# Patient Record
Sex: FEMALE | Race: WHITE | NOT HISPANIC OR LATINO | Employment: OTHER | URBAN - METROPOLITAN AREA
[De-identification: names, ages, dates, MRNs, and addresses within clinical notes are randomized per-mention and may not be internally consistent; named-entity substitution may affect disease eponyms.]

---

## 2017-10-26 NOTE — PATIENT DISCUSSION
Capsular haze appears visually significant; RECOMMEND CONSULT with DR Laura Mujica for possible YAG capsulotomy.

## 2019-03-18 ENCOUNTER — NEW PATIENT COMPREHENSIVE (OUTPATIENT)
Dept: URBAN - METROPOLITAN AREA CLINIC 43 | Facility: CLINIC | Age: 74
End: 2019-03-18

## 2019-03-18 DIAGNOSIS — H25.811: ICD-10-CM

## 2019-03-18 DIAGNOSIS — H52.03: ICD-10-CM

## 2019-03-18 DIAGNOSIS — H53.2: ICD-10-CM

## 2019-03-18 DIAGNOSIS — H25.812: ICD-10-CM

## 2019-03-18 PROCEDURE — 92015 DETERMINE REFRACTIVE STATE: CPT

## 2019-03-18 PROCEDURE — 92004 COMPRE OPH EXAM NEW PT 1/>: CPT

## 2019-03-18 ASSESSMENT — VISUAL ACUITY
OD_SC: J16
OS_BAT: 20/400
OU_CC: J1
OD_CC: 20/40-1
OD_SC: 20/80
OU_CC: 20/40+2
OS_CC: 20/40
OS_SC: 20/400
OD_CC: J1
OU_SC: 20/80-1
OS_CC: J3
OS_SC: 20/200+1
OD_BAT: 20/400
OU_SC: J16

## 2019-03-18 ASSESSMENT — TONOMETRY
OS_IOP_MMHG: 12
OD_IOP_MMHG: 12

## 2019-03-26 ENCOUNTER — CATARACT CONSULT (OUTPATIENT)
Dept: URBAN - METROPOLITAN AREA CLINIC 43 | Facility: CLINIC | Age: 74
End: 2019-03-26

## 2019-03-26 DIAGNOSIS — H04.123: ICD-10-CM

## 2019-03-26 DIAGNOSIS — H53.2: ICD-10-CM

## 2019-03-26 DIAGNOSIS — H25.812: ICD-10-CM

## 2019-03-26 DIAGNOSIS — H25.811: ICD-10-CM

## 2019-03-26 DIAGNOSIS — H18.51: ICD-10-CM

## 2019-03-26 PROCEDURE — V2799I IMPRIMIS

## 2019-03-26 PROCEDURE — 92025-1 CORNEAL TOPOGRAPHY, INS

## 2019-03-26 PROCEDURE — 92286 ANT SGM IMG I&R SPECLR MIC: CPT

## 2019-03-26 PROCEDURE — 92014 COMPRE OPH EXAM EST PT 1/>: CPT

## 2019-03-26 PROCEDURE — 92136TC INTERFEROMETRY - TECHNICAL COMPONENT

## 2019-03-26 ASSESSMENT — VISUAL ACUITY
OD_BAT: 20/400
OD_CC: J2
OS_AM: 20/20
OS_SC: J12
OD_SC: J12
OD_CC: 20/40
OS_SC: 20/200
OD_RAM: 20/20
OD_SC: 20/60-2
OS_BAT: 20/400
OS_CC: 20/40-1
OS_CC: J4

## 2019-03-26 ASSESSMENT — TONOMETRY
OS_IOP_MMHG: 13
OD_IOP_MMHG: 13

## 2019-03-28 ENCOUNTER — DIAGNOSTICS ONLY (OUTPATIENT)
Dept: URBAN - METROPOLITAN AREA CLINIC 39 | Facility: CLINIC | Age: 74
End: 2019-03-28

## 2019-03-28 PROCEDURE — 92025 CPTRIZED CORNEAL TOPOGRAPHY: CPT

## 2019-03-28 PROCEDURE — 92136 OPHTHALMIC BIOMETRY: CPT

## 2019-03-29 ENCOUNTER — SURGERY/PROCEDURE (OUTPATIENT)
Dept: URBAN - METROPOLITAN AREA SURGERY 14 | Facility: SURGERY | Age: 74
End: 2019-03-29

## 2019-03-29 ENCOUNTER — PRE-OP/H&P (OUTPATIENT)
Dept: URBAN - METROPOLITAN AREA CLINIC 39 | Facility: CLINIC | Age: 74
End: 2019-03-29

## 2019-03-29 ENCOUNTER — POST-OP CATARACT (OUTPATIENT)
Dept: URBAN - METROPOLITAN AREA CLINIC 39 | Facility: CLINIC | Age: 74
End: 2019-03-29

## 2019-03-29 DIAGNOSIS — H18.51: ICD-10-CM

## 2019-03-29 DIAGNOSIS — H02.831: ICD-10-CM

## 2019-03-29 DIAGNOSIS — H02.834: ICD-10-CM

## 2019-03-29 DIAGNOSIS — H25.812: ICD-10-CM

## 2019-03-29 DIAGNOSIS — Z96.1: ICD-10-CM

## 2019-03-29 DIAGNOSIS — H04.123: ICD-10-CM

## 2019-03-29 DIAGNOSIS — H25.811: ICD-10-CM

## 2019-03-29 DIAGNOSIS — H53.2: ICD-10-CM

## 2019-03-29 DIAGNOSIS — I69.898: ICD-10-CM

## 2019-03-29 PROCEDURE — 99211T TECH SERVICE

## 2019-03-29 PROCEDURE — 66984 XCAPSL CTRC RMVL W/O ECP: CPT

## 2019-03-29 ASSESSMENT — TONOMETRY: OD_IOP_MMHG: 12

## 2019-03-29 ASSESSMENT — VISUAL ACUITY: OD_SC: 20/70

## 2019-04-01 ENCOUNTER — POST OP/EVAL OF SECOND EYE (OUTPATIENT)
Dept: URBAN - METROPOLITAN AREA CLINIC 43 | Facility: CLINIC | Age: 74
End: 2019-04-01

## 2019-04-01 DIAGNOSIS — H53.2: ICD-10-CM

## 2019-04-01 DIAGNOSIS — Z96.1: ICD-10-CM

## 2019-04-01 DIAGNOSIS — H04.123: ICD-10-CM

## 2019-04-01 DIAGNOSIS — H02.831: ICD-10-CM

## 2019-04-01 DIAGNOSIS — H25.812: ICD-10-CM

## 2019-04-01 PROCEDURE — 99024 POSTOP FOLLOW-UP VISIT: CPT

## 2019-04-01 PROCEDURE — 92012 INTRM OPH EXAM EST PATIENT: CPT

## 2019-04-01 ASSESSMENT — TONOMETRY
OD_IOP_MMHG: 13
OS_IOP_MMHG: 14

## 2019-04-01 ASSESSMENT — VISUAL ACUITY
OS_CC: 20/40
OD_SC: 20/30-2
OS_BAT: 20/400

## 2019-04-05 ENCOUNTER — PRE-OP/H&P (OUTPATIENT)
Dept: URBAN - METROPOLITAN AREA CLINIC 39 | Facility: CLINIC | Age: 74
End: 2019-04-05

## 2019-04-05 ENCOUNTER — SURGERY/PROCEDURE (OUTPATIENT)
Dept: URBAN - METROPOLITAN AREA SURGERY 14 | Facility: SURGERY | Age: 74
End: 2019-04-05

## 2019-04-05 ENCOUNTER — POST-OP (OUTPATIENT)
Dept: URBAN - METROPOLITAN AREA CLINIC 39 | Facility: CLINIC | Age: 74
End: 2019-04-05

## 2019-04-05 DIAGNOSIS — Z96.1: ICD-10-CM

## 2019-04-05 DIAGNOSIS — H25.812: ICD-10-CM

## 2019-04-05 DIAGNOSIS — H53.2: ICD-10-CM

## 2019-04-05 PROCEDURE — 99211T TECH SERVICE

## 2019-04-05 PROCEDURE — 66984 XCAPSL CTRC RMVL W/O ECP: CPT

## 2019-04-05 ASSESSMENT — TONOMETRY
OD_IOP_MMHG: 12
OD_IOP_MMHG: 10
OS_IOP_MMHG: 10
OS_IOP_MMHG: 14

## 2019-04-05 ASSESSMENT — VISUAL ACUITY
OD_SC: 20/30
OS_SC: J12
OD_SC: J12
OS_SC: 20/200
OS_SC: 20/50

## 2019-04-08 ENCOUNTER — CATARACT POST-OP 1-DAY (OUTPATIENT)
Dept: URBAN - METROPOLITAN AREA CLINIC 43 | Facility: CLINIC | Age: 74
End: 2019-04-08

## 2019-04-08 DIAGNOSIS — Z96.1: ICD-10-CM

## 2019-04-08 DIAGNOSIS — H53.2: ICD-10-CM

## 2019-04-08 PROCEDURE — 99024 POSTOP FOLLOW-UP VISIT: CPT

## 2019-04-08 ASSESSMENT — VISUAL ACUITY
OD_SC: J3-
OD_SC: 20/40-1
OS_SC: J6-
OS_SC: 20/50+1

## 2019-04-08 ASSESSMENT — TONOMETRY: OS_IOP_MMHG: 20

## 2019-04-22 ENCOUNTER — POST-OP CATARACT (OUTPATIENT)
Dept: URBAN - METROPOLITAN AREA CLINIC 43 | Facility: CLINIC | Age: 74
End: 2019-04-22

## 2019-04-22 DIAGNOSIS — H04.123: ICD-10-CM

## 2019-04-22 DIAGNOSIS — H53.2: ICD-10-CM

## 2019-04-22 DIAGNOSIS — Z96.1: ICD-10-CM

## 2019-04-22 PROCEDURE — 99024 POSTOP FOLLOW-UP VISIT: CPT

## 2019-04-22 ASSESSMENT — TONOMETRY
OD_IOP_MMHG: 10
OS_IOP_MMHG: 12

## 2019-04-22 ASSESSMENT — VISUAL ACUITY
OD_SC: 20/50+2
OS_SC: 20/50+1
OD_SC: J8
OS_SC: J12

## 2019-04-30 ENCOUNTER — EST. PATIENT EMERGENCY (OUTPATIENT)
Dept: URBAN - METROPOLITAN AREA CLINIC 43 | Facility: CLINIC | Age: 74
End: 2019-04-30

## 2019-04-30 DIAGNOSIS — H53.2: ICD-10-CM

## 2019-04-30 DIAGNOSIS — Z96.1: ICD-10-CM

## 2019-04-30 PROCEDURE — 99024 POSTOP FOLLOW-UP VISIT: CPT

## 2019-04-30 ASSESSMENT — VISUAL ACUITY
OS_SC: 20/40-2
OD_SC: 20/30+2

## 2019-10-14 ENCOUNTER — ESTABLISHED COMPREHENSIVE EXAM (OUTPATIENT)
Dept: URBAN - METROPOLITAN AREA CLINIC 43 | Facility: CLINIC | Age: 74
End: 2019-10-14

## 2019-10-14 DIAGNOSIS — Z96.1: ICD-10-CM

## 2019-10-14 DIAGNOSIS — E11.9: ICD-10-CM

## 2019-10-14 DIAGNOSIS — H02.831: ICD-10-CM

## 2019-10-14 DIAGNOSIS — H02.834: ICD-10-CM

## 2019-10-14 PROCEDURE — 92015 DETERMINE REFRACTIVE STATE: CPT

## 2019-10-14 PROCEDURE — 92014 COMPRE OPH EXAM EST PT 1/>: CPT

## 2019-10-14 ASSESSMENT — TONOMETRY
OD_IOP_MMHG: 10
OS_IOP_MMHG: 11

## 2019-10-14 ASSESSMENT — VISUAL ACUITY
OS_SC: J6-
OS_CC: 20/50-2
OD_SC: J4
OD_SC: 20/40-1+2
OS_SC: 20/40-1
OD_CC: 20/40-2
OD_CC: J1
OS_CC: J2

## 2019-10-24 ENCOUNTER — CONSULT (OUTPATIENT)
Dept: URBAN - METROPOLITAN AREA CLINIC 43 | Facility: CLINIC | Age: 74
End: 2019-10-24

## 2019-10-24 DIAGNOSIS — H02.834: ICD-10-CM

## 2019-10-24 DIAGNOSIS — H02.832: ICD-10-CM

## 2019-10-24 DIAGNOSIS — H02.831: ICD-10-CM

## 2019-10-24 DIAGNOSIS — H02.835: ICD-10-CM

## 2019-10-24 PROCEDURE — 92285 EXTERNAL OCULAR PHOTOGRAPHY: CPT

## 2019-10-24 PROCEDURE — 99213 OFFICE O/P EST LOW 20 MIN: CPT

## 2019-10-24 ASSESSMENT — VISUAL ACUITY
OD_SC: 20/40-1
OS_SC: 20/40-1

## 2019-10-30 ENCOUNTER — TECH ONLY (OUTPATIENT)
Dept: URBAN - METROPOLITAN AREA CLINIC 43 | Facility: CLINIC | Age: 74
End: 2019-10-30

## 2019-10-30 DIAGNOSIS — H02.831: ICD-10-CM

## 2019-10-30 DIAGNOSIS — H02.834: ICD-10-CM

## 2019-10-30 PROCEDURE — 92082 INTERMEDIATE VISUAL FIELD XM: CPT

## 2019-10-30 PROCEDURE — 99211T TECH SERVICE

## 2020-01-16 ENCOUNTER — PRE-OP/H&P (OUTPATIENT)
Dept: URBAN - METROPOLITAN AREA CLINIC 44 | Facility: CLINIC | Age: 75
End: 2020-01-16

## 2020-01-16 DIAGNOSIS — H02.831: ICD-10-CM

## 2020-01-16 DIAGNOSIS — H02.832: ICD-10-CM

## 2020-01-16 DIAGNOSIS — Z96.1: ICD-10-CM

## 2020-01-16 DIAGNOSIS — H02.834: ICD-10-CM

## 2020-01-16 DIAGNOSIS — H02.835: ICD-10-CM

## 2020-01-16 PROCEDURE — 99211HP H&P OFFICE/OUTPATIENT VISIT, EST

## 2020-01-16 RX ORDER — ERYTHROMYCIN 5 MG/G
OINTMENT OPHTHALMIC
Start: 2020-02-04

## 2020-02-03 ENCOUNTER — PRE-OP/H&P (OUTPATIENT)
Dept: URBAN - METROPOLITAN AREA SURGERY 14 | Facility: SURGERY | Age: 75
End: 2020-02-03

## 2020-02-03 ENCOUNTER — SURGERY/PROCEDURE (OUTPATIENT)
Dept: URBAN - METROPOLITAN AREA SURGERY 14 | Facility: SURGERY | Age: 75
End: 2020-02-03

## 2020-02-03 DIAGNOSIS — Z96.1: ICD-10-CM

## 2020-02-03 DIAGNOSIS — H02.834: ICD-10-CM

## 2020-02-03 DIAGNOSIS — H02.832: ICD-10-CM

## 2020-02-03 DIAGNOSIS — H02.831: ICD-10-CM

## 2020-02-03 DIAGNOSIS — H02.835: ICD-10-CM

## 2020-02-03 PROCEDURE — 99499 UNLISTED E&M SERVICE: CPT

## 2020-02-03 PROCEDURE — 1582350 UPPER BLEPH PER EYE FUNCTIONAL-BILATERAL

## 2020-02-13 ENCOUNTER — POST-OP (OUTPATIENT)
Dept: URBAN - METROPOLITAN AREA CLINIC 44 | Facility: CLINIC | Age: 75
End: 2020-02-13

## 2020-02-13 DIAGNOSIS — Z98.890: ICD-10-CM

## 2020-02-13 PROCEDURE — 99024 POSTOP FOLLOW-UP VISIT: CPT

## 2020-02-13 ASSESSMENT — VISUAL ACUITY
OS_SC: 20/30
OD_SC: 20/25

## 2020-02-19 ENCOUNTER — EST. PATIENT EMERGENCY (OUTPATIENT)
Dept: URBAN - METROPOLITAN AREA CLINIC 43 | Facility: CLINIC | Age: 75
End: 2020-02-19

## 2020-02-19 DIAGNOSIS — H04.123: ICD-10-CM

## 2020-02-19 PROCEDURE — 66999PO NON CO-MANAGED OTHER SURGERY PO

## 2020-02-19 RX ORDER — CARBOXYMETHYLCELLULOSE SODIUM AND GLYCERIN 5; 9 MG/ML; MG/ML: 1 SOLUTION/ DROPS OPHTHALMIC

## 2020-02-19 ASSESSMENT — TONOMETRY
OS_IOP_MMHG: 11
OD_IOP_MMHG: 11

## 2020-02-19 ASSESSMENT — VISUAL ACUITY
OS_SC: 20/40-1
OD_SC: 20/30-2
OD_CC: J1
OS_CC: J3-

## 2020-11-10 ENCOUNTER — ESTABLISHED COMPREHENSIVE EXAM (OUTPATIENT)
Dept: URBAN - METROPOLITAN AREA CLINIC 43 | Facility: CLINIC | Age: 75
End: 2020-11-10

## 2020-11-10 DIAGNOSIS — H18.513: ICD-10-CM

## 2020-11-10 DIAGNOSIS — Z96.1: ICD-10-CM

## 2020-11-10 DIAGNOSIS — H04.123: ICD-10-CM

## 2020-11-10 DIAGNOSIS — E11.9: ICD-10-CM

## 2020-11-10 DIAGNOSIS — I69.898: ICD-10-CM

## 2020-11-10 PROCEDURE — 92015 DETERMINE REFRACTIVE STATE: CPT

## 2020-11-10 PROCEDURE — 92014 COMPRE OPH EXAM EST PT 1/>: CPT

## 2020-11-10 ASSESSMENT — VISUAL ACUITY
OS_SC: J6-
OD_SC: 20/30-2
OS_SC: 20/50
OD_SC: J6-
OS_CC: J2-
OD_CC: J1-

## 2020-11-10 ASSESSMENT — TONOMETRY
OS_IOP_MMHG: 12
OD_IOP_MMHG: 10

## 2022-01-10 ENCOUNTER — COMPREHENSIVE EXAM (OUTPATIENT)
Dept: URBAN - METROPOLITAN AREA CLINIC 43 | Facility: CLINIC | Age: 77
End: 2022-01-10

## 2022-01-10 DIAGNOSIS — H52.203: ICD-10-CM

## 2022-01-10 DIAGNOSIS — H52.02: ICD-10-CM

## 2022-01-10 DIAGNOSIS — H02.831: ICD-10-CM

## 2022-01-10 DIAGNOSIS — H04.123: ICD-10-CM

## 2022-01-10 DIAGNOSIS — H02.834: ICD-10-CM

## 2022-01-10 DIAGNOSIS — H52.4: ICD-10-CM

## 2022-01-10 DIAGNOSIS — H26.493: ICD-10-CM

## 2022-01-10 DIAGNOSIS — E11.9: ICD-10-CM

## 2022-01-10 PROCEDURE — 92015 DETERMINE REFRACTIVE STATE: CPT

## 2022-01-10 PROCEDURE — 92014 COMPRE OPH EXAM EST PT 1/>: CPT

## 2022-01-10 ASSESSMENT — VISUAL ACUITY
OD_BAT: 20/80
OD_CC: J2
OS_BAT: 20/200-1
OS_SC: 20/40-1+2
OS_CC: J2-
OD_SC: J6-
OS_SC: J8
OD_SC: 20/40+1

## 2022-01-10 ASSESSMENT — TONOMETRY: OD_IOP_MMHG: 13

## 2023-01-11 ENCOUNTER — COMPREHENSIVE EXAM (OUTPATIENT)
Dept: URBAN - METROPOLITAN AREA CLINIC 43 | Facility: CLINIC | Age: 78
End: 2023-01-11

## 2023-01-11 DIAGNOSIS — H52.4: ICD-10-CM

## 2023-01-11 DIAGNOSIS — H26.493: ICD-10-CM

## 2023-01-11 DIAGNOSIS — E11.9: ICD-10-CM

## 2023-01-11 DIAGNOSIS — H04.123: ICD-10-CM

## 2023-01-11 DIAGNOSIS — H52.203: ICD-10-CM

## 2023-01-11 DIAGNOSIS — H52.02: ICD-10-CM

## 2023-01-11 PROCEDURE — 92015 DETERMINE REFRACTIVE STATE: CPT

## 2023-01-11 PROCEDURE — 92014 COMPRE OPH EXAM EST PT 1/>: CPT

## 2023-01-11 ASSESSMENT — VISUAL ACUITY
OD_SC: 20/20-1
OS_CC: J6
OD_CC: J1-
OS_SC: 20/25+1
OS_SC: J10
OD_SC: J6

## 2023-01-11 ASSESSMENT — TONOMETRY
OD_IOP_MMHG: 14
OS_IOP_MMHG: 15

## 2024-01-11 ENCOUNTER — COMPREHENSIVE EXAM (OUTPATIENT)
Dept: URBAN - METROPOLITAN AREA CLINIC 43 | Facility: CLINIC | Age: 79
End: 2024-01-11

## 2024-01-11 DIAGNOSIS — H52.4: ICD-10-CM

## 2024-01-11 DIAGNOSIS — H04.123: ICD-10-CM

## 2024-01-11 DIAGNOSIS — H52.203: ICD-10-CM

## 2024-01-11 DIAGNOSIS — E11.9: ICD-10-CM

## 2024-01-11 DIAGNOSIS — H26.493: ICD-10-CM

## 2024-01-11 DIAGNOSIS — H52.02: ICD-10-CM

## 2024-01-11 DIAGNOSIS — Z79.4: ICD-10-CM

## 2024-01-11 PROCEDURE — 92014 COMPRE OPH EXAM EST PT 1/>: CPT

## 2024-01-11 PROCEDURE — 92015 DETERMINE REFRACTIVE STATE: CPT

## 2024-01-11 ASSESSMENT — VISUAL ACUITY
OS_CC: J6
OD_BAT: 20/25
OS_BAT: 20/40-1
OS_SC: J8
OS_SC: 20/40+2
OD_SC: J4
OD_SC: 20/30-2
OD_CC: J2

## 2024-01-11 ASSESSMENT — TONOMETRY
OD_IOP_MMHG: 14
OS_IOP_MMHG: 15

## 2024-02-05 ASSESSMENT — VISUAL ACUITY: OS_BAT: 20/40

## 2024-02-08 ENCOUNTER — ESTABLISHED PATIENT (OUTPATIENT)
Dept: URBAN - METROPOLITAN AREA CLINIC 39 | Facility: CLINIC | Age: 79
End: 2024-02-08

## 2024-03-07 ENCOUNTER — PREPPED CHART (OUTPATIENT)
Dept: URBAN - METROPOLITAN AREA CLINIC 39 | Facility: CLINIC | Age: 79
End: 2024-03-07